# Patient Record
(demographics unavailable — no encounter records)

---

## 2025-03-28 NOTE — HISTORY OF PRESENT ILLNESS
[Home] : at home, [unfilled] , at the time of the visit. [Other Location: e.g. Home (Enter Location, City,State)___] : at [unfilled] [Telehealth (audio & video)] : This visit was provided via telehealth using real-time 2-way audio visual technology. [Verbal consent obtained from patient] : the patient, [unfilled] [FreeTextEntry8] : 29 y F closed head injury 2 wk ago already evaluated in ED with non-actionable CT h and cspine Was on yoga ball, fell backwards and struck head.  Had some initial nausea and HA as well as tingling in R hand fingers Since then, improving migraines dizziness believes some thumb numbness (improving) Symptoms are mostly exertional  Screens do not significantly bother her.  No significant changes to sleep cycle.  No emotional lability Occasionally gets blurred vision with exertion better but still requiring her to take daily APAP (no more than 500mg/day) This is Concussion #3 -- Symptoms in the past have not been prolonged NKDA LMP last week

## 2025-03-28 NOTE — PLAN
[FreeTextEntry1] : Zofran prescribed Concussion education provided at length C/W APAP PRN Concussion network/sports medicine referral Very strict ED precautions provided

## 2025-03-28 NOTE — ASSESSMENT
[FreeTextEntry1] : Concussion, 3rd in lifetime, symptoms are improving but have persisted x 2 weeks.  Already has had neuro imaging performed.   Symptoms well maintained with APAP No indication for repeat imaging or ED evaluation

## 2025-03-28 NOTE — PHYSICAL EXAM
[Supple] : supple [de-identified] : PLEASE SEE HPI FOR ADDITIONAL RELEVANT PHYSICAL EXAM FINDINGS GENERAL: no acute distress, nontoxic HEAD: normocephalic EYES: no scleral icterus NECK: trachea midline, Full ROM/supple RESP: no respiratory distress, no obvious wheeze or stridor, no accessory muscle use noted ABD: nondistended MSK: no gross deformity NEURO: alert & fully oriented SKIN: no rash PSYCH: cooperative, good insight, appropriate, fluent speech  [de-identified] : Grossly SARMIENTO with symmetric strength